# Patient Record
Sex: MALE | Race: WHITE | NOT HISPANIC OR LATINO | Employment: FULL TIME | URBAN - METROPOLITAN AREA
[De-identification: names, ages, dates, MRNs, and addresses within clinical notes are randomized per-mention and may not be internally consistent; named-entity substitution may affect disease eponyms.]

---

## 2019-03-19 ENCOUNTER — OFFICE VISIT - HEALTHEAST (OUTPATIENT)
Dept: FAMILY MEDICINE | Facility: CLINIC | Age: 29
End: 2019-03-19

## 2019-03-19 DIAGNOSIS — F33.0 MILD RECURRENT MAJOR DEPRESSION (H): ICD-10-CM

## 2019-03-19 DIAGNOSIS — F10.11 MILD ALCOHOL ABUSE IN SUSTAINED REMISSION: ICD-10-CM

## 2019-03-19 DIAGNOSIS — F41.9 ANXIETY: ICD-10-CM

## 2019-03-19 DIAGNOSIS — F19.20 CHEMICAL DEPENDENCY (H): ICD-10-CM

## 2019-03-19 LAB
ALBUMIN SERPL-MCNC: 4.2 G/DL (ref 3.5–5)
ALP SERPL-CCNC: 77 U/L (ref 45–120)
ALT SERPL W P-5'-P-CCNC: 19 U/L (ref 0–45)
ANION GAP SERPL CALCULATED.3IONS-SCNC: 8 MMOL/L (ref 5–18)
AST SERPL W P-5'-P-CCNC: 21 U/L (ref 0–40)
BILIRUB SERPL-MCNC: 0.6 MG/DL (ref 0–1)
BUN SERPL-MCNC: 9 MG/DL (ref 8–22)
CALCIUM SERPL-MCNC: 9.7 MG/DL (ref 8.5–10.5)
CHLORIDE BLD-SCNC: 103 MMOL/L (ref 98–107)
CO2 SERPL-SCNC: 31 MMOL/L (ref 22–31)
CREAT SERPL-MCNC: 0.82 MG/DL (ref 0.7–1.3)
GFR SERPL CREATININE-BSD FRML MDRD: >60 ML/MIN/1.73M2
GGT SERPL-CCNC: 14 U/L (ref 0–50)
GLUCOSE BLD-MCNC: 42 MG/DL (ref 70–125)
POTASSIUM BLD-SCNC: 4.3 MMOL/L (ref 3.5–5)
PROT SERPL-MCNC: 6.8 G/DL (ref 6–8)
SODIUM SERPL-SCNC: 142 MMOL/L (ref 136–145)

## 2019-03-19 ASSESSMENT — MIFFLIN-ST. JEOR: SCORE: 1759.01

## 2019-03-21 ENCOUNTER — COMMUNICATION - HEALTHEAST (OUTPATIENT)
Dept: FAMILY MEDICINE | Facility: CLINIC | Age: 29
End: 2019-03-21

## 2019-04-05 ENCOUNTER — OFFICE VISIT - HEALTHEAST (OUTPATIENT)
Dept: BEHAVIORAL HEALTH | Facility: CLINIC | Age: 29
End: 2019-04-05

## 2019-04-05 DIAGNOSIS — F10.11 MILD ALCOHOL ABUSE IN SUSTAINED REMISSION: ICD-10-CM

## 2019-07-22 ENCOUNTER — OFFICE VISIT - HEALTHEAST (OUTPATIENT)
Dept: BEHAVIORAL HEALTH | Facility: CLINIC | Age: 29
End: 2019-07-22

## 2019-07-22 DIAGNOSIS — F41.9 ANXIETY: ICD-10-CM

## 2019-07-22 DIAGNOSIS — F45.8 BRUXISM: ICD-10-CM

## 2019-07-22 DIAGNOSIS — F33.0 MILD RECURRENT MAJOR DEPRESSION (H): ICD-10-CM

## 2019-07-22 ASSESSMENT — MIFFLIN-ST. JEOR: SCORE: 1823.08

## 2020-01-10 ENCOUNTER — OFFICE VISIT - HEALTHEAST (OUTPATIENT)
Dept: SLEEP MEDICINE | Facility: CLINIC | Age: 30
End: 2020-01-10

## 2020-01-10 DIAGNOSIS — R06.83 SNORING: ICD-10-CM

## 2020-01-10 DIAGNOSIS — F41.9 ANXIETY: ICD-10-CM

## 2020-01-10 DIAGNOSIS — G47.10 HYPERSOMNIA, UNSPECIFIED: ICD-10-CM

## 2020-01-10 DIAGNOSIS — G47.63 BRUXISM, SLEEP-RELATED: ICD-10-CM

## 2020-01-10 ASSESSMENT — MIFFLIN-ST. JEOR: SCORE: 1806.75

## 2020-07-27 ENCOUNTER — OFFICE VISIT - HEALTHEAST (OUTPATIENT)
Dept: BEHAVIORAL HEALTH | Facility: CLINIC | Age: 30
End: 2020-07-27

## 2020-07-27 DIAGNOSIS — F33.0 MILD RECURRENT MAJOR DEPRESSION (H): ICD-10-CM

## 2020-07-27 DIAGNOSIS — F41.1 GENERALIZED ANXIETY DISORDER: ICD-10-CM

## 2020-07-27 ASSESSMENT — PATIENT HEALTH QUESTIONNAIRE - PHQ9: SUM OF ALL RESPONSES TO PHQ QUESTIONS 1-9: 13

## 2020-07-27 ASSESSMENT — ANXIETY QUESTIONNAIRES
GAD7 TOTAL SCORE: 7
5. BEING SO RESTLESS THAT IT IS HARD TO SIT STILL: SEVERAL DAYS
6. BECOMING EASILY ANNOYED OR IRRITABLE: SEVERAL DAYS
4. TROUBLE RELAXING: SEVERAL DAYS
7. FEELING AFRAID AS IF SOMETHING AWFUL MIGHT HAPPEN: SEVERAL DAYS
2. NOT BEING ABLE TO STOP OR CONTROL WORRYING: SEVERAL DAYS
1. FEELING NERVOUS, ANXIOUS, OR ON EDGE: SEVERAL DAYS
3. WORRYING TOO MUCH ABOUT DIFFERENT THINGS: SEVERAL DAYS

## 2020-09-11 ENCOUNTER — COMMUNICATION - HEALTHEAST (OUTPATIENT)
Dept: BEHAVIORAL HEALTH | Facility: CLINIC | Age: 30
End: 2020-09-11

## 2021-05-26 ASSESSMENT — PATIENT HEALTH QUESTIONNAIRE - PHQ9: SUM OF ALL RESPONSES TO PHQ QUESTIONS 1-9: 13

## 2021-05-28 ASSESSMENT — ANXIETY QUESTIONNAIRES: GAD7 TOTAL SCORE: 7

## 2021-05-30 NOTE — PROGRESS NOTES
Correct pharmacy verified with patient and confirmed in snapshot? [x] yes []no    Charge captured ? [x] yes  [] no    Medications Phoned  to Pharmacy [] yes [x]no  Name of Pharmacist:  List Medications, including dose, quantity and instructions      Medication Prescriptions given to patient   [] yes  [x] no   List the name of the drug the prescription was written for.       Medications ordered this visit were e-scribed.  Verified by order class [x] yes  [] no   Effexor and Gabapenttin  Medication changes or discontinuations were communicated to patient's pharmacy: [] yes  [x] no    UA collected [] yes  [x] no    [place] Prescription Monitoring [place] Reviewed? [x] yes  [] no    Referrals were made to: Sleep study.     Future appointment was made: [x] yes  [] no  9/23/19  Dictation completed at time of chart check: [] yes  [x] no    I have checked the documentation for today s encounters and the above information has been reviewed and completed.

## 2021-05-31 NOTE — PATIENT INSTRUCTIONS - HE
Please contact crisis or go to the emergency room if you have thoughts of self harm or suicide.  Take medication as prescribed. Please do not make changes or adjustments to your medications without talking to a health professional first.  Contact the pharmacy if you are out of medication and in need of a refill.

## 2021-06-02 VITALS — BODY MASS INDEX: 25.05 KG/M2 | HEIGHT: 70 IN | WEIGHT: 175 LBS

## 2021-06-03 VITALS — HEIGHT: 70 IN | WEIGHT: 190 LBS | BODY MASS INDEX: 27.2 KG/M2

## 2021-06-04 VITALS
HEIGHT: 70 IN | OXYGEN SATURATION: 98 % | HEART RATE: 65 BPM | SYSTOLIC BLOOD PRESSURE: 117 MMHG | WEIGHT: 186.4 LBS | BODY MASS INDEX: 26.69 KG/M2 | DIASTOLIC BLOOD PRESSURE: 74 MMHG

## 2021-06-05 NOTE — PROGRESS NOTES
[facility]  Outpatient Sleep Medicine Consultation      Name: Jese Martinez     MRN# [id number]  Age: 29 y.o.         YOB: 1990    Date of Consultation: 1/10/2020  Consultation is requested by: Rajan Sorto, CNP  [address] [place] [address]  Primary care provider: Ronnie Beltre MD        Assessment and Plan:  1. Snoring   Patient is being evaluated for Obstructive Sleep Apnea (ANTHONY).  Risk factors for ANTHONY include:  snoring, witnessed apneas, excessive daytime sleepiness/subjective tiredness, and male gender.  Recommend HSAT since patient is high risk for ANTHONY without any relevant comorbid conditions.  I counseled the patient that a negative home sleep apnea test would be reassuring but if his symptoms were to persist or worsen, evaluation with an attended polysomnogram would be indicated.  Patient will follow up on my chart for results.    2. Hypersomnia, unspecified  The patient was strongly advised to avoid driving, operating any heavy machinery or engaging in other hazardous situations while drowsy or sleepy.  Patient was counseled on the importance of driving while alert and to pull over if drowsy.    I advised that he optimize his sleep hygiene by maintaining fixed bedtimes and wake up times.  Additionally, he should manage his light exposure at night and in the morning to promote sleep or wakefulness as appropriate.    3. Bruxism, sleep-related  Sleep-related bruxism is apparent and if obstructive sleep apnea is in the mild range he may consider an oral appliance that would address both sleep disordered breathing and bruxism.    4. Anxiety  I suggested that he discuss treatment of anxiety with gabapentin with his prescribing provider.  Gabapentin may be contributing to daytime sleepiness since one of the most common side effects is somnolence.  Alternatively, the dose may be reduced or the medication may be consumed at  "nighttime.    Chief Complaint: \"I stop breathing and sweat during my sleep\"      History of Present Illness:    Jese Martinez is a 29 y.o. male who I am seeing for the first time in my adult sleep medicine clinic.  He reports that snoring and gasping for breath have been reported to him during his sleep.  Additionally, he notes that he has profuse sweating during sleep.  Sleep-related bruxism is also problematic and he is being evaluated for a bite splint.  He has daytime hypersomnolence which has been problematic.  The patient works for [employer] on the [employer] and has a job that involves the potential for danger if he is not vigilant.    Usual bedtime is variable between 9 PM and 12 AM.  He estimates a 20-minute sleep latency.  He estimates 1-2 awakenings per night and his final wake up time is between 7 and 8 AM.  He estimates 6 to 8 hours of sleep on a nightly basis and it is sleep \"okay\".  He typically consumes 2 coffees in the morning.  He is recently following a vegan diet.  He endorses excessive daytime sleepiness and tiredness during the day and his score on the [place] Sleepiness Scale is borderline abnormal with 10 points out of 24.    Medical history is significant for anxiety and outpatient treatment for clonazepam wean related to alcohol abuse.  He reports that he has not taken clonazepam or used alcohol for about 4 years.  He was formally a smoker but quit about 1.5 years ago.  He has no history of hypertension or diabetes.  His BMI is slightly elevated but his body habitus would suggest that this is related to muscle mass.    Family history is positive for snoring in his father.  There are no known family members with sleep apnea.  Maternal grandfather had a history of heart disease.  Patient takes Effexor and gabapentin, typically at 7 AM.  He explains that gabapentin was prescribed as he tapered off of clonazepam.  Apparently there was some speculation about a possibility of " "seizure while he was in outpatient care.  Review of systems is positive for a stomach rash which she reports is related to working out.  He also has intentionally lost some weight recently.            Medications:      Current Outpatient Medications   Medication Sig Dispense Refill     gabapentin (NEURONTIN) 600 MG tablet Take 1 tablet (600 mg total) by mouth 2 (two) times a day. 60 tablet 11     venlafaxine (EFFEXOR-XR) 150 MG 24 hr capsule Take 1 capsule (150 mg total) by mouth Daily at 8:00 am.. 30 capsule 11     No current facility-administered medications for this visit.          No Known Allergies      Past Medical History:    Past Medical History:   Diagnosis Date     Chemical dependency (H)      Depression          Past Surgical History:    Past Surgical History:   Procedure Laterality Date     CLAVICLE SURGERY Left      LAPAROSCOPIC INGUINAL HERNIA REPAIR Right      RADICAL ORCHIECTOMY Left          Social History:    Social History     Tobacco Use     Smoking status: Former Smoker     Types: Cigarettes     Last attempt to quit: 2018     Years since quittin.6     Smokeless tobacco: Current User     Tobacco comment: vaping   Substance Use Topics     Alcohol use: No     Frequency: Never         Family History:    Family History   Problem Relation Age of Onset     Cancer Mother      Alcohol abuse Sister      Depression Sister      Drug abuse Sister      Mental illness Sister      Heart disease Maternal Grandfather          Review of Systems:    A complete 10 point review of systems was negative other than HPI or as commented below.      Physical Examination:  /74   Pulse 65   Ht 5' 10\" (1.778 m)   Wt 186 lb 6.4 oz (84.6 kg)   SpO2 98%   BMI 26.75 kg/m    Neck circumference: 15 inches  Constitutional: . Awake, alert, cooperative, in no apparent distress.  Eyes: No icterus.  ENT: Mallampati Class: III.  Tongue:  large.  Nose: Nares patent. No exudate/erythema. No septal deviation noted.  "   Cardiovascular: Regular S1 and S2, no gallops or murmurs.  Neck: Supple, no thyroid enlargement.  Pulmonary:  Chest symmetric, lungs clear bilaterally and no crackles, wheezes or rales.  Extremities:  No pretibial edema.  Skin:  No rash or significant lesions visible.  Gait:  Normal.  Neurologic: Alert, oriented, no focal neurological deficit.  Psychological: Euthymic; affect appropriate.    Data: All pertinent previous laboratory data reviewed.    No results found for: PH  No results found for: TSH  No components found for: GLC  Lab Results   Component Value Date    HGB 14.6 07/06/2012     Lab Results   Component Value Date    BUN 9 03/19/2019     Lab Results   Component Value Date    AST 21 03/19/2019    ALT 19 03/19/2019    ALKPHOS 77 03/19/2019     No components found for: UAMP, UBARB, BENZODIAZEUR, UCANN, UCOC, OPIT, UPCP      Carin Segura MD  1/10/2020  [facility]  [address], [address]  [place]  [address]  [phone]    Copy to: Ronnie Beltre MD

## 2021-06-10 ENCOUNTER — OFFICE VISIT (OUTPATIENT)
Dept: FAMILY MEDICINE | Facility: CLINIC | Age: 31
End: 2021-06-10
Payer: COMMERCIAL

## 2021-06-10 VITALS
TEMPERATURE: 97.5 F | HEIGHT: 70 IN | OXYGEN SATURATION: 97 % | WEIGHT: 209 LBS | HEART RATE: 73 BPM | DIASTOLIC BLOOD PRESSURE: 80 MMHG | BODY MASS INDEX: 29.92 KG/M2 | SYSTOLIC BLOOD PRESSURE: 124 MMHG

## 2021-06-10 DIAGNOSIS — R07.89 ATYPICAL CHEST PAIN: Primary | ICD-10-CM

## 2021-06-10 PROCEDURE — 99214 OFFICE O/P EST MOD 30 MIN: CPT | Performed by: FAMILY MEDICINE

## 2021-06-10 RX ORDER — CYCLOBENZAPRINE HCL 5 MG
5-10 TABLET ORAL
Qty: 30 TABLET | Refills: 0 | Status: SHIPPED | OUTPATIENT
Start: 2021-06-10 | End: 2021-06-29

## 2021-06-10 RX ORDER — VENLAFAXINE HYDROCHLORIDE 150 MG/1
150 CAPSULE, EXTENDED RELEASE ORAL DAILY
COMMUNITY
Start: 2021-06-10 | End: 2021-08-05

## 2021-06-10 ASSESSMENT — PAIN SCALES - GENERAL: PAINLEVEL: MODERATE PAIN (5)

## 2021-06-10 ASSESSMENT — MIFFLIN-ST. JEOR: SCORE: 1914.03

## 2021-06-10 NOTE — PROGRESS NOTES
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  Continue self-help recovery, and general awareness about recovery principles.    He remains sober at this time.  I will move this diagnosis into medical history.     Major depressive disorder, recurrent in full remission: Patient doing well and currently stable on venlafaxine.  He is taking this for a long time.  No clear side effects, though he has had some bruxism-bruxism predated initiation of venlafaxine.  The bruxism is faded.  He continues to take venlafaxine, and feels it is helpful for him.  Depression remains in and in remission at this time.  Follow-up with me in 6 months.     ADHD: Appears borderline clinically significant.  A SRS-V1.1 symptom checklist: 2 out of the 6 part a questions suggest further screening to corroborate functionality deficits associated with ADHD.  He was diagnosed with ADHD in his late teen years, and placed on stimulant therapy in his late teen years with significant benefit on Vyvanse.  This is all per patient report.  Consider Vyvanse if patient is going to school, and struggling with focus and concentration.  Patient is doing well at work and home.  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  No change to the treatment plan, which is to monitor ADHD, and consider treatment if he starts school and there is functional impact.     Fatigue:  This appears / described as improved.  The sleep specialist reviewed and recommended consideration for reducing gabapentin.  Client tells me he has reduced gabapentin in the past, and this caused worsening mood and irritability.  He feels that fatigue is minimal at this point and tolerable.  He would like to continue on gabapentin.  I did review the risks benefits and potential adverse reactions with  gabapentin with the patient verbalizing an adequate understanding.     Bruxism: Does not appear pertinent to psychiatric / mental health issues, but it was identified during original assessment in July 2019.  Bruxism since late adolescence. Bruxism predtaes psychotropic medications.   He reports that there is less bruxism.  He is going to the dentist next month.  Sleep specialist is recommending HSAT aand patient says he will follow up with this.    HPI: Jese Martinze is a 29 y.o. male with generalized anxiety disorder and history of depression.  Both of these are in remission at this time.  He reports he continues to take venlafaxine and gabapentin.  He did undergo an assessment by sleep medicine specialist.  The sleep medicine specialist recommended a trial of a CPAP at home, and the patient says he will follow through with this, however with the coronavirus, it has put a delay in getting this done.  He says he intends to follow-up and is aware of the contact information believes he will do so.    He reports bruxism is less noticeable.  He says he has been attending to help oral hygiene, and has a dental appointment next month.    He reports his appetite is normal.  He reports fairly good sleep at this point.  He reports manageable anxiety.  He denies panic attacks.  He denies excessive worry, worrying about too many different things, or difficulty controlling worry.  He says that in general his mood is good.    He tells me that he has in the past tried reducing gabapentin, he says that he forgot to put it in his medication set in the past, and this led to him having a worse mood, progressively worsening with irritability, mood swings, and general emotional discomfort when he realized he had not been taking gabapentin as prescribed.  He did get back on it, would like to continue taking it at this time.  He notes no other side effects to gabapentin or venlafaxine.    He quit smoking a year and a half ago.  " Mild intensity urges to drink, but easily managed, and he continues to be sober since 2015.  He reports he works out on a regular basis since the gyms have opened back up.  Dental appointment in August.    Anxiety and worry: \"its been alright.\"  \"I do gt it from time to time.\" \"I try to not watch to much news.\"    Sleep: \"good.\"    Mood: good.    He works with [employer] and says things are going well there despite significant reduction in number of people flying.    No Known Allergies  Current Outpatient Medications   Medication Sig Dispense Refill     gabapentin (NEURONTIN) 600 MG tablet Take 1 tablet (600 mg total) by mouth 2 (two) times a day. 60 tablet 11     venlafaxine (EFFEXOR-XR) 150 MG 24 hr capsule Take 1 capsule (150 mg total) by mouth Daily at 8:00 am.. 30 capsule 11     No current facility-administered medications for this visit.          Medication adherence: Reviewed risk/benefits of medication , Patient able to verbalize understanding of side effects  and Patient verbally consents to taking medications  Medication side effects: none  The patient was given information on medications gabapentin and venlafaxine.  [place] Prescription Monitoring program: No worrisome pharmacy activity    Clinical Outcomes Measures:  PHQ-9: PHQ-9 Total Score: 13  DANIE-7:  7      Lab Results: Reviewed  No visits with results within 30 Day(s) from this visit.   Latest known visit with results is:   Office Visit on 03/19/2019   Component Date Value     Sodium 03/19/2019 142      Potassium 03/19/2019 4.3      Chloride 03/19/2019 103      CO2 03/19/2019 31      Anion Gap, Calculation 03/19/2019 8      Glucose 03/19/2019 42*     BUN 03/19/2019 9      Creatinine 03/19/2019 0.82      GFR MDRD Af Amer 03/19/2019 >60      GFR MDRD Non Af Amer 03/19/2019 >60      Bilirubin, Total 03/19/2019 0.6      Calcium 03/19/2019 9.7      Protein, Total 03/19/2019 6.8      Albumin 03/19/2019 4.2      Alkaline Phosphatase 03/19/2019 77      " "AST 03/19/2019 21      ALT 03/19/2019 19      GGT (Gamma GT) 03/19/2019 14        Review of Systems: [unfilled]    Psychiatric Examination:  There were no vitals filed for this visit.  There is no height or weight on file to calculate BMI.  Appearance: The patient appears stated age, is appropriately dressed in work attire.    Reliability:  Patient appears to be an adequate historian.    Behavior: Patient makes good eye contact, and engages with normal rapport in the interview.   There is no evidence of responding to hallucinations or flashbacks.  Speech: Speech is coherent, with a normal rate, rhythm and tone.    Language:There are no difficulties with expressive or receptive language as observed throughout the interview.    Mood: Described as \"pretty good\".    Affect: Congruent and shows a normal range and level of reactivity.    Judgement: Able to make basic decision regarding safety.  Insight: Good awareness of physical and mental health conditions and aware of needs around care for these.  Thought process: Logical   Thought content: No evidence of delusions or paranoia.  No thoughts of self harm or suicide. No thoughts of harming others.  Associations: Connected  Fund of knowledge: Average  Attention / Concentration: Able to remain focused during the interview with minimal distractibility or need for redirection.  Short Term Memory: Grossly intact as evidence by client recalling themes and ideas discussed.  Long Term Memory: Intact  No abnormal motor movements observed in face.  extremetis not observed..      [facility]  Psychiatry Nurse Practitioner    This note was created with help of Dragon dictation system. Grammatical and word substitution errors are not intentional.      "

## 2021-06-10 NOTE — PATIENT INSTRUCTIONS - HE
Please contact crisis or go to the emergency room if you have thoughts of self harm or suicide.  Take medication as prescribed. Please do not make changes or adjustments to your medications without talking to a health professional first.  Contact the pharmacy if you are out of medication and in need of a refill.  F/U 6 months

## 2021-06-10 NOTE — PROGRESS NOTES
Assessment & Plans  Jese is a 30 year old male who presents to follow up on chronic chest pain.     Differentials include but is not limited to costochondritis vs GERD vs peptic ulcer disease vs esophageal spasm/dysmotility vs functional chest pain vs drug induced (cocain/methamphetamin). Patient reports pain is worse after workout/heavy wt lifting.  Patient has been sober form drugs, alcohol and smoking for about 6 years - that is reassuring against the  possibility of cocaine/drugs induced chest pain. Patient is not immunocompromised, no hx of candidiasis, no dysphagia and pain is not post prandial - all these are reassuring against esophageal origin.     At this point likely etiology is Costochondritis  - Due to ongoing symptoms which were not typical ACS, EKG was not done. If symptoms continue to persist then will order stress test and CXR for further evaluation  - Scheduled Ibuprofen 400 to 600 mg three times daily as needed for pain, with food.   - Flexeril 5 to 10 mg at night as needed for pain, avoid driving after taking medication.   - Call or message on Monday if symptoms are not improving.       Subjective   Jese is a 30 year old who presents to follow up on chest pain.     HPI   Jese Martinez is a 30 y.o. male with hx of generalized anxiety disorder, chemical dependency (has been sober for 6 years), history of depression, ADHD (both of these are under controlled at this time). He presents today to discuss about chronic chest pain that has been ongoing for about a year and half.     He reports his c/pain has been an ongoing issue for about 1& 1/2 years, resides on the left 2nd/3rd sternal side, comes and goes in nature, does not radiate. sometimes 2/10, sometimes 4/10, especially after heavy workout. He reports he works out regularly, he is also a  at the gym. Sometimes lifts about 100 pounds. His pain aggravates day after heavy workout.    He reports sometimes he gets excessive  "saliva, at bed time, sour test, and needs throat clearing that makes chest pain better. Reports dry cough as well.     He reports he has been sober from alcohol/smoking/other chemicals/drugs for about 6 years. His moode is well, does not feel hopeless, helpless, does not have thoughts of hurting self or others and enjoied day to day activities. Otherwise he is doing well.    He continues to take venlafaxine and gabapentin and OTC pain medicine (NSAID) for chest pain.    Denies HA, SOB, neck pain, arm pain, light headedness, palpitation, fever and chills. Denies change in sleep, BM and urine. Appetite is good, mode is well.     A 10 point ROS negative otherwise mentioned in HPI      Objective    /80 (BP Location: Right arm, Patient Position: Chair, Cuff Size: Adult Regular)   Pulse 73   Temp 97.5  F (36.4  C) (Tympanic)   Ht 1.786 m (5' 10.3\")   Wt 94.8 kg (209 lb)   SpO2 97%   BMI 29.73 kg/m    Body mass index is 29.73 kg/m .     Vitals:  /80   Pulse 73   Temp 97.5  F (36.4  C)   Temp src Tympanic   SpO2 97 %       Physical Exam   GEN: Alert, Oriented, not in distress  RESP: lungs clear to auscultation - no rales, rhonchi or wheezes  CV: regular rate and rhythm, normal S1 S2, no murmur, click or rub  MS: + chest wall tenderness         Physician Attestation   I, Ct Hinojosa, was present with the medical/ARNULFO student who participated in the service and in the documentation of the note.  I have verified the history and personally performed the physical exam and medical decision making.  I agree with the assessment and plan of care as documented in the note.        Ct Hinojosa MD        "

## 2021-06-10 NOTE — PATIENT INSTRUCTIONS
Thursday to Monday -   Scheduled Ibuprofen 400 to 600 mg three times daily as needed for pain, please take with food.   Flexeril 5 to 10 mg at night as needed for pain, please do not drive after taking medication.   Call or message me on Monday if symptoms are not improving.

## 2021-06-19 NOTE — LETTER
Letter by Ronnie Beltre MD at      Author: Ronnie Beltre MD Service: -- Author Type: --    Filed:  Encounter Date: 3/21/2019 Status: (Other)         Jese Martinez  [address] [address]             March 21, 2019         Dear Mr. Martinez,    Below are the results from your recent visit:    Resulted Orders   Comprehensive Metabolic Panel   Result Value Ref Range    Sodium 142 136 - 145 mmol/L    Potassium 4.3 3.5 - 5.0 mmol/L    Chloride 103 98 - 107 mmol/L    CO2 31 22 - 31 mmol/L    Anion Gap, Calculation 8 5 - 18 mmol/L    Glucose 42 (LL) 70 - 125 mg/dL    BUN 9 8 - 22 mg/dL    Creatinine 0.82 0.70 - 1.30 mg/dL    GFR MDRD Af Amer >60 >60 mL/min/1.73m2    GFR MDRD Non Af Amer >60 >60 mL/min/1.73m2    Bilirubin, Total 0.6 0.0 - 1.0 mg/dL    Calcium 9.7 8.5 - 10.5 mg/dL    Protein, Total 6.8 6.0 - 8.0 g/dL    Albumin 4.2 3.5 - 5.0 g/dL    Alkaline Phosphatase 77 45 - 120 U/L    AST 21 0 - 40 U/L    ALT 19 0 - 45 U/L    Narrative    Fasting Glucose reference range is 70-99 mg/dL per  American Diabetes Association (ADA) guidelines.   GGT (Gamma GT)   Result Value Ref Range    GGT (Gamma GT) 14 0 - 50 U/L       Blood sugar on the low side, eat some  frequent snack.    Please call with questions or contact us using [employer].    Sincerely,        Electronically signed by Ronnie Beltre MD

## 2021-06-20 NOTE — LETTER
Letter by Svetlana Quevedo RN at      Author: Svetlana Quevedo RN Service: -- Author Type: --    Filed:  Encounter Date: 9/11/2020 Status: (Other)       September 11, 2020              Jese GARSIA Michelle  [address] [place] [address]      Dear Mr. Martinez:    Im writing to inform you that Rajan Sorto CNP will be leaving [facility] on September 28, 2020.  We apologize for this disruption in your care and we are committed to supporting your treatment needs. If you have follow-up appointments after September 28, 2020, we will connect you with another mental health provider within our system.  For medication refills, please contact your pharmacy and they will connect with us to initiate the refill process.     To schedule an appointment with Rajan Sorto CNP before September 28, 2020 please call [facility] at [phone]. If you do not plan to return for ongoing medication management at this time, please let us know that as well, so we can note that in your medical record.     Again, we apologize for the inconvenience and look forward to continuing to provide you with the best possible care.    Sincerely,        Electronically signed by Svetlana Quevedo RN   [facility]   [facility]   [address] [place] [address]   [phone]

## 2021-06-25 NOTE — PROGRESS NOTES
"OFFICE VISIT - FAMILY MEDICINE     ASSESSMENT AND PLAN     1. Mild alcohol abuse in sustained remission  Comprehensive Metabolic Panel    GGT (Gamma GT)   2. Anxiety  gabapentin (NEURONTIN) 600 MG tablet    Ambulatory referral to Psychiatry   3. Mild recurrent major depression (H)  venlafaxine (EFFEXOR-XR) 150 MG 24 hr capsule    Ambulatory referral to Psychiatry   4. Chemical dependency (H)     History of alcohol abuse, has been in remission for the past 4 years, encouraged to continue with sobriety.  Anxiety depression controlled with gabapentin and Effexor, new referral was placed to follow with psychiatrist.    CHIEF COMPLAINT   Establish Care (would like flu vaccine) and Referral (psychiatrist)    HPI   Jese Martinez is a 28 y.o. male.  No Patient Care Coordination Note on file.  New patient, history of alcohol abuse, previous treatment at [facility], sober for the past 4 years, history of anxiety with depression, currently taking gabapentin 300 mg twice daily, Effexor 150 mg once a day, looking for a new psychiatrist, asking for a referral.  Denies any worsening anxiety or depression symptoms.  He works for [employer] as a , non-smoker.  Quit smoking in June of last year.    Review of Systems As per HPI, otherwise negative.    OBJECTIVE   /65 (Patient Site: Right Arm, Patient Position: Sitting, Cuff Size: Adult Regular)   Pulse 67   Ht 5' 10.25\" (1.784 m)   Wt 175 lb (79.4 kg)   SpO2 98%   BMI 24.93 kg/m    Physical Exam   Constitutional: He is oriented to person, place, and time. He appears well-developed and well-nourished.   HENT:   Head: Normocephalic and atraumatic.   Neck: Normal range of motion. Neck supple. No JVD present. No tracheal deviation present. No thyromegaly present.   Cardiovascular: Normal rate, regular rhythm, normal heart sounds and intact distal pulses. Exam reveals no gallop and no friction rub.   No murmur heard.  Pulmonary/Chest: Effort normal and " breath sounds normal. No respiratory distress. He has no wheezes. He has no rales.   Musculoskeletal: He exhibits no edema or tenderness.   Lymphadenopathy:     He has no cervical adenopathy.   Neurological: He is alert and oriented to person, place, and time. Coordination normal.   Psychiatric: He has a normal mood and affect. Judgment and thought content normal.       PFSH     Family History   Problem Relation Age of Onset     Cancer Mother      Alcohol abuse Sister      Depression Sister      Drug abuse Sister      Mental illness Sister      Heart disease Maternal Grandfather      Social History     Socioeconomic History     Marital status: Single     Spouse name: Not on file     Number of children: Not on file     Years of education: Not on file     Highest education level: Not on file   Occupational History     Not on file   Social Needs     Financial resource strain: Not on file     Food insecurity:     Worry: Not on file     Inability: Not on file     Transportation needs:     Medical: Not on file     Non-medical: Not on file   Tobacco Use     Smoking status: Former Smoker     Types: Cigarettes     Last attempt to quit: 2018     Years since quittin.8     Smokeless tobacco: Current User     Tobacco comment: vaping   Substance and Sexual Activity     Alcohol use: No     Frequency: Never     Drug use: No     Comment: former user     Sexual activity: Not on file   Lifestyle     Physical activity:     Days per week: Not on file     Minutes per session: Not on file     Stress: Not on file   Relationships     Social connections:     Talks on phone: Not on file     Gets together: Not on file     Attends Faith service: Not on file     Active member of club or organization: Not on file     Attends meetings of clubs or organizations: Not on file     Relationship status: Not on file     Intimate partner violence:     Fear of current or ex partner: Not on file     Emotionally abused: Not on file     Physically  abused: Not on file     Forced sexual activity: Not on file   Other Topics Concern     Not on file   Social History Narrative     Not on file     Relevant history was reviewed with the patient today, unless noted in HPI, nothing is pertinent for this visit.  [facility]     Patient Active Problem List    Diagnosis Date Noted     Mild recurrent major depression (H) 03/19/2019     Anxiety 03/19/2019     Mild alcohol abuse in sustained remission 03/19/2019     Chemical dependency (H)      No past surgical history on file.    RESULTS/CONSULTS (Lab/Rad)     Recent Results (from the past 168 hour(s))   Comprehensive Metabolic Panel   Result Value Ref Range    Sodium 142 136 - 145 mmol/L    Potassium 4.3 3.5 - 5.0 mmol/L    Chloride 103 98 - 107 mmol/L    CO2 31 22 - 31 mmol/L    Anion Gap, Calculation 8 5 - 18 mmol/L    Glucose 42 (LL) 70 - 125 mg/dL    BUN 9 8 - 22 mg/dL    Creatinine 0.82 0.70 - 1.30 mg/dL    GFR MDRD Af Amer >60 >60 mL/min/1.73m2    GFR MDRD Non Af Amer >60 >60 mL/min/1.73m2    Bilirubin, Total 0.6 0.0 - 1.0 mg/dL    Calcium 9.7 8.5 - 10.5 mg/dL    Protein, Total 6.8 6.0 - 8.0 g/dL    Albumin 4.2 3.5 - 5.0 g/dL    Alkaline Phosphatase 77 45 - 120 U/L    AST 21 0 - 40 U/L    ALT 19 0 - 45 U/L   GGT (Gamma GT)   Result Value Ref Range    GGT (Gamma GT) 14 0 - 50 U/L     No results found.  MEDICATIONS     No current outpatient medications on file prior to visit.     No current facility-administered medications on file prior to visit.        HEALTH MAINTENANCE / SCREENING   PHQ-2 Total Score: 0 (3/19/2019 10:10 AM)  , PHQ-9 Total Score: 1 (3/19/2019 10:10 AM)  ,DANIE 7 Total Score: 0 (3/19/2019 12:00 PM)    Immunization History   Administered Date(s) Administered     Dtap 01/11/1990, 03/09/1990, 05/16/1990, 05/24/1991, 11/14/1991     HPV 9 Valent 07/25/2016     Hep A, historic 04/06/2007, 10/19/2007     Hep B, Adult 07/25/2011     Hep B, Peds or Adolescent 08/04/1999, 08/04/1999, 09/03/1999, 09/03/1999, 06/14/2000,  06/14/2000     Hepatitis A, Ped/Adol 2 Dose IM (18yr & under) 04/06/2007, 10/19/2007     HiB, historic,unspecified 02/06/1991, 05/24/1991, 09/06/1991     Hib (PRP-T) 02/06/1991, 05/24/1991, 09/06/1991     History of Varicella/chicken Pox 05/10/1996     IPV 01/11/1990, 03/09/1990, 05/24/1991, 11/14/1994     Influenza,seasonal quad, PF, 36+MOS 10/13/2015, 09/21/2017     Influenza,seasonal, Inj IIV3 11/18/2005, 09/06/2012     MMR 05/16/1990, 02/06/1991, 05/10/2002     Meningococcal MCV4P 06/01/2007     Meningococcal MPSV4, SQ 06/01/2007     Pneumo Polysac 23-V 05/29/2012     Td, Adult, Absorbed 05/10/2002     Tdap 09/12/2008, 09/08/2010     Health Maintenance   Topic     DEPRESSION FOLLOW UP      INFLUENZA VACCINE RULE BASED (1)     TD 18+ HE      ADVANCE DIRECTIVES DISCUSSED WITH PATIENT      TDAP ADULT ONE TIME DOSE        Ronnie Beltre MD  Family Medicine, [facility]     This note was dictated using a voice recognition software.  Any grammatical or context distortion are unintentional and inherent to the software.

## 2021-06-27 NOTE — PROGRESS NOTES
Progress Notes by Elda Tabor LPN at 7/22/2019  8:20 AM     Author: Elda Tabor LPN Service: -- Author Type: Licensed Nurse    Filed: 8/5/2019 11:12 AM Encounter Date: 7/22/2019 Status: Signed    : Elda Tabor LPN (Licensed Nurse)       Pt is here to establish care with psychiatrist.  Sleep: 8-12 hrs  Anxiety:minimal  Depression: denies  C/O of low energy sometimes

## 2021-06-27 NOTE — PROGRESS NOTES
Progress Notes by Laurita Dominique at 2019 12:00 PM     Author: Laurita Dominique Service: -- Author Type: Psychology Intern    Filed: 2019 10:42 AM Encounter Date: 2019 Status: Attested    : Laurita Dominique (Psychology Intern) Cosigner: Elena Cartagena Psy.D, LP at 2019 12:28 PM    Attestation signed by Elena Cartagena Psy.D, LP at 2019 12:28 PM    I have read, discussed and agree with the documentation provided by Laurita Dominique MA, Psychology Intern.  Elena Cartagena PsyD, ABPDAE, JOEL                  PSYCHOLOGY CONSULTATION    Date of Service:  2019  Duration:  1 hour (12pm - 1pm)    Name:  Jese Martinez  :  1990  MRN:  [id number]    This is a dual signature report.  My supervising clinician is Dr. Elena Cartagena.    The patient was referred  for a standard diagnostic assessment.    The purpose, benefits, and risks of psychological assessment and treatment were reviewed.  The patient agreed to proceed.  The patient was able to participate and benefit from treatment as evidenced by her verbal expression and understanding of ideas discussed.    Presenting Problem:    Jese Martinez is a 28 y.o., ,  single, identifies as a heterosexual, male who reports a history of substance use disorders.  He reported that he has completed programming for chemical dependency at [facility] and currently is seeking medication management from a psychiatrist in the community.  He reported that he plans to seek out ongoing psychotherapy as needed.  He reported that [facility] encouraged him to find these resources in the community. Jese said that he has struggled with his ability to concentrate since becoming sober, but is seeing progressive improvement on his current medications. He reported that he has been sober for approximately 4 years. He shared that his primary concerns have historically been substance use and anxiety. He reported that he started smoking  "weed and drinking at a young age and his parents became concerned and sent him to a \"sober school\" in [place]. He stated that he has been historically triggered by environmental stressors to use substances. He noted an example of a breakup with him and his girlfriend in high school. He was prescribed adderall for ADHD at the age of 19. He shared that he experimented with many drugs throughout his life and found clonopin to be one of the most problematic for himself.     When asked about what has contributed to his success in his sobriety he reported that he has been enjoying the \"freeing feeling\" he has now that he has \"nothing to hide.\" He also reported that eating healthy and having an active lifestyle has helped him to feel better physically.     Are there any other factors that are contributing to your presenting concerns (contextual non-personal factors contributing to presenting concerns):  Jese GARSIA Michelle denied contextual, non-personal factors contributing to his present concerns.    How do you perceive your condition compared to how others perceive your presenting concerns:  Jese reported being proud of himself and his sobriety and believes that others feel the same.     PYSCHIATRIC REVIEW OF CURRENT SYSTEMS:  Depressive Symptoms:  Patient reported indecisiveness, diminished ability to concentrate.    Manic Symptoms:  Patient reported that his only symptoms of freya existed when he was under the use of substances.     Anxiety Symptoms:  Patient reported that he was more anxious as a child, but now deals with it less. .     He reported that he feels as though he now has great coping strategies: he will check for evidence for his anxious thoughts and plan things out better now to avoid anxiety.    Panic Symptoms:  He denied a history of panic attacks. Although he did report that about a month of being sober was similar to panic attack     PTSD Symptoms:  Denied    Psychotic Symptoms:  Not since " becoming sober    Cognitive Symptoms: Jese reported some cognitive symptoms, including:confused by bills, sometimes getting lost, miss important steps of something, small oversights on tasks.     Other:    Jese Martinez denied difficulties with None.    Alcohol Abuse/Dependence:  Pt has a substance abuse hx, but has been sober for over 4 years.    HISTORY:  Past Psychiatric/Chemical Dependency History:  He indicated that he has a history of chemical dependency.    Medications:    Current Outpatient Medications:   ?  gabapentin (NEURONTIN) 600 MG tablet, Take 1 tablet (600 mg total) by mouth 2 (two) times a day., Disp: 60 tablet, Rfl: 3  ?  venlafaxine (EFFEXOR-XR) 150 MG 24 hr capsule, Take 1 capsule (150 mg total) by mouth Daily at 8:00 am.., Disp: 30 capsule, Rfl: 3    Past Medical History:   Patient confirmed Medical History as discussed in the record.    Past Medical History:   Diagnosis Date   ? Chemical dependency (H)    ? Depression        Allergies:  No Known Allergies    Past Family Psychiatric History:  Jese Martinez endorsed family psychiatric history. He reported that his aunt had a history of depression and suicide depression.     Past Family Medical/Physical History:  Jese Martinez denied significant family medical history.     Past Family Chemical Dependency History:  Jese Martinez endorsed family chemical dependency history. He reported that his grandpa was dependent on alcohol.     How does your culture impact health and healthcare: Jese Martinez denied cultural impact on his health and healthcare.   Health beliefs and engagement in cultural specific healing practices (describe the patients physical health sxs/diagnosis and use of traditional rather than western medical practices in treating illness): Standard western medical care    MENTAL STATUS EVALUATION  Grooming: Well groomed  Attire: Appropriate  Age: Appears Stated  Behavior Towards  "Examiner: cooperative, polite and friendly  Motor Activity: Within normal   Eye Contact: Appropriate  Mood: Euthymic  Affect: Congruent w/content of speech  Speech/Language: Within normal  Attention: intact  Concentration: intact  Thought Process: Within normal  Thought Content: Normal  Orientation: person, place, time and situation  Memory: No Evidence of Impairment  Judgement: No Evidence of Impairment  Estimated Intelligence: Average  Demonstrated Insight: Adequate  Fund of Knowledge: adequate    Mr. Martinez denied current suicidal ideation, intent, or plan.   He denied current homicidal ideation, intent, or plan.     SOCIAL HISTORY:  Jese Martinez was born in [place] and attended school in [place]. He reported that he has always had a close relationship with his mother, who first noticed his substance use problems. He shared that growing up he began experimenting with drugs recreationally with friends and had episodes of use with his partner at the time. He reported that he was often anxious in high school and used substances to make him \"feel how [he] wanted to feel.\"    At the age of 20, he reported intense use of \"bar drugs\". He shared that he had an episode of \"shaking in [his] room\" and in that moment realized \"what addiction was.\" He reportedly called his mom to ask for help. He described that time of his life as a point where he, \"sold everything, had bad friends, weighed 120 pounds, and had the  looking for [him].\"    Jese reported that since receiving treatment he has higher quality friends in his life who share similar values to himself. He said he prefers to spend time with his fellow sober friends. He shared that he goes to meetings every day that promote his sobriety and enjoys spending time at [employer], the gym, and working on his automotive hobbies. He reported being satisfied with finding activities that are healthy and not related to substance use. Jese shared that " he has inspired other co-workers to also adapt healthier lifestyles. He reported that he currently works for [employer] and has enjoyed this experience. His hard work has been acknowledged by his employer, and he has won awards for perfect attendance.     Important Developmental Incidence: He denied.     The patient denied physical, sexual, and emotional abuse.    School/Work History:  He denied learning disabilities.      Sexual Orientation: heterosexual  Current living situation:  He is currently living with girlfriend rent an apartment. No concerns for safety or ent .  Marital/relationship history: in relationship with his girlfriend  Patient Evaluation on quality of relationships:  5/5 for relationshipw   Social Network/Support Systems/Hobbies & Interests:  He described his support network as close friends, family, and co-workers. He reported he enjoys working on his jeep, working out, eating healthy, going to meetings, spending time with friends.  Strengths/Personal/Community Resources: He reported personal strengths including positive and get-along with everybody, go out of his way to share his talents to help people (e.g., with their cars), strong work ethic.  Spirituality beliefs:  Mr. Martinez endorsed Moravian affiliation. Rastafari, helpful coping strategy reading verses.    Cultural Identification:    Race:     Experience of cultural bias: He denied.    Immigration/history of status:  NA   Level of acculturation:  NA   Time orientation: He is present focused.   Age group identification:  young   Socioeconomic status:  middle class   Financial concerns: He denied.   Locus of Control: internal    Family Involvement: Is the family involved in the diagnostic assessment and process? No  If so, their agreement to referrals and follow-up plan: NA    LEGAL HISTORY:  Mr. Martinez endorsed a legal history which includes roommate finding paraphanilia and hit and run and a fight with police,  high speed miller,  within a month .    CLINICAL FORMULATION:  Jese Martinez is a 28 y.o., ,  not , identifies as a heterosexual, male who reports difficulties with concentration since becoming sober approximately 4 years ago. He reported that he would like to continue his current treatment plan for his recovery, as he feels he has seen improvements.    Mr. Martinez meets DSM-5 criteria for Mild alcohol abuse in sustained remission, as evidenced by his self-report of symptoms and review of the medical record.    WHODAS 12 item version was administered. Jese Martinez received a score of 12.5%.,    CLINICAL DIAGNOSIS:    Mild alcohol abuse in sustained remission, per medical record    Problem List:  Patient Active Problem List   Diagnosis   ? Mild recurrent major depression (H)   ? Anxiety   ? Mild alcohol abuse in sustained remission   ? Chemical dependency (H)       FOLLOW UP PLAN:  1. Jese Martinez would benefit from medication management in order to continue monitoring his concentration and attention concerns in sobriety.  The same prescriber for his psychiatric medication is recommended in order to streamline and maximize therapeutic gain.  2. Jese should continue to engage in his work with community support, through meetings, mentors, and healthy friendships to promote personal and social well-being.   3. Jese Martinez should engage in healthy sleep hygiene, in order to maximize his chances of being able to get a good night's rest once his psychological symptoms are better controlled. The National Sleep Foundation recommends healthy sleep hygiene activities including going to bed and waking at the same time each day, making sure the bedroom is a quiet, dark, relaxing environment, an appropriate temperature, and that it is used only for sleeping. Electronic devices should not be used while in bed.    Performed and documented by: Laurita Dominique MA,  Doctoral Psychology Student  Supervising Clinician: Dr. Elena Cartagena  Date:  4/5/2019  Time:  12:09 PM    I have read, discussed and agree with the documentation provided by Laurita Dominique MA, Psychology Intern.    Elena Cartagena PsyD, ABPP, LP

## 2021-06-28 DIAGNOSIS — R07.89 ATYPICAL CHEST PAIN: ICD-10-CM

## 2021-06-28 NOTE — TELEPHONE ENCOUNTER
Routing refill request to provider for review/approval because:  Drug not on the [facility] refill protocol         Last Written Prescription Date:  6/10/21  Last Fill Quantity: 30,  # refills: 0   Last office visit: 6/10/2021 with prescribing provider:     Future Office Visit:

## 2021-06-29 RX ORDER — CYCLOBENZAPRINE HCL 5 MG
5-10 TABLET ORAL
Qty: 30 TABLET | Refills: 0 | Status: SHIPPED | OUTPATIENT
Start: 2021-06-29 | End: 2021-08-05

## 2021-06-29 NOTE — PROGRESS NOTES
Progress Notes by Shiloh Macario CMA at 7/27/2020 10:40 AM     Author: Shiloh Macario CMA Service: -- Author Type: Certified Medical Assistant    Filed: 7/27/2020  1:08 PM Encounter Date: 7/27/2020 Status: Signed    : Shiloh Macario CMA (Certified Medical Assistant)       This video/telephone visit will be conducted via a call between you and your physician/provider. We have found that certain health care needs can be provided without the need for an in-person physical exam. This service lets us provide the care you need with a video /telephone conversation. If a prescription is necessary we can send it directly to your pharmacy. If lab work is needed we can place an order for that and you can then stop by our lab to have the test done at a later time.   Just as we bill insurance for in-person visits, we also bill insurance for video/telephone visits. If you have questions about your insurance coverage, we recommend that you speak with your insurance company.   Patient has given verbal consent for video/Telephone visit? yes  Patient would like the video visit invitation sent by:   My chart  Patient verified allergies, medications and pharmacy via phone. PHQ : 13 and DANIE: 7  done verbally with writer. Patient states he is ready for visit.  Shiloh Macario CMA  [place]  was reviewed prior to seeing patient today. See below for embedded report.

## 2021-08-05 ENCOUNTER — OFFICE VISIT (OUTPATIENT)
Dept: FAMILY MEDICINE | Facility: CLINIC | Age: 31
End: 2021-08-05
Payer: COMMERCIAL

## 2021-08-05 VITALS
DIASTOLIC BLOOD PRESSURE: 78 MMHG | SYSTOLIC BLOOD PRESSURE: 120 MMHG | WEIGHT: 202 LBS | OXYGEN SATURATION: 98 % | BODY MASS INDEX: 28.74 KG/M2 | HEART RATE: 78 BPM

## 2021-08-05 DIAGNOSIS — F41.9 ANXIETY: Primary | ICD-10-CM

## 2021-08-05 DIAGNOSIS — Z23 NEED FOR TDAP VACCINATION: ICD-10-CM

## 2021-08-05 DIAGNOSIS — F32.5 MAJOR DEPRESSION IN COMPLETE REMISSION (H): ICD-10-CM

## 2021-08-05 LAB
ALBUMIN SERPL-MCNC: 4 G/DL (ref 3.5–5)
ALP SERPL-CCNC: 75 U/L (ref 45–120)
ALT SERPL W P-5'-P-CCNC: 14 U/L (ref 0–45)
ANION GAP SERPL CALCULATED.3IONS-SCNC: 11 MMOL/L (ref 5–18)
AST SERPL W P-5'-P-CCNC: 15 U/L (ref 0–40)
BILIRUB SERPL-MCNC: 0.4 MG/DL (ref 0–1)
BUN SERPL-MCNC: 10 MG/DL (ref 8–22)
CALCIUM SERPL-MCNC: 10.1 MG/DL (ref 8.5–10.5)
CHLORIDE BLD-SCNC: 102 MMOL/L (ref 98–107)
CO2 SERPL-SCNC: 29 MMOL/L (ref 22–31)
CREAT SERPL-MCNC: 0.9 MG/DL (ref 0.7–1.3)
GFR SERPL CREATININE-BSD FRML MDRD: >90 ML/MIN/1.73M2
GLUCOSE BLD-MCNC: 96 MG/DL (ref 70–125)
POTASSIUM BLD-SCNC: 4.3 MMOL/L (ref 3.5–5)
PROT SERPL-MCNC: 7.9 G/DL (ref 6–8)
SODIUM SERPL-SCNC: 142 MMOL/L (ref 136–145)

## 2021-08-05 PROCEDURE — 96127 BRIEF EMOTIONAL/BEHAV ASSMT: CPT | Performed by: FAMILY MEDICINE

## 2021-08-05 PROCEDURE — 90715 TDAP VACCINE 7 YRS/> IM: CPT | Performed by: FAMILY MEDICINE

## 2021-08-05 PROCEDURE — 90471 IMMUNIZATION ADMIN: CPT | Performed by: FAMILY MEDICINE

## 2021-08-05 PROCEDURE — 99213 OFFICE O/P EST LOW 20 MIN: CPT | Mod: 25 | Performed by: FAMILY MEDICINE

## 2021-08-05 PROCEDURE — 80053 COMPREHEN METABOLIC PANEL: CPT | Performed by: FAMILY MEDICINE

## 2021-08-05 PROCEDURE — 36415 COLL VENOUS BLD VENIPUNCTURE: CPT | Performed by: FAMILY MEDICINE

## 2021-08-05 RX ORDER — GABAPENTIN 600 MG/1
600 TABLET ORAL 2 TIMES DAILY
Qty: 180 TABLET | Refills: 0 | Status: SHIPPED | OUTPATIENT
Start: 2021-08-05 | End: 2022-07-15

## 2021-08-05 RX ORDER — VENLAFAXINE HYDROCHLORIDE 150 MG/1
150 CAPSULE, EXTENDED RELEASE ORAL DAILY
Qty: 90 CAPSULE | Refills: 0 | Status: SHIPPED | OUTPATIENT
Start: 2021-08-05 | End: 2021-11-04

## 2021-08-05 ASSESSMENT — ANXIETY QUESTIONNAIRES
1. FEELING NERVOUS, ANXIOUS, OR ON EDGE: SEVERAL DAYS
IF YOU CHECKED OFF ANY PROBLEMS ON THIS QUESTIONNAIRE, HOW DIFFICULT HAVE THESE PROBLEMS MADE IT FOR YOU TO DO YOUR WORK, TAKE CARE OF THINGS AT HOME, OR GET ALONG WITH OTHER PEOPLE: SOMEWHAT DIFFICULT
3. WORRYING TOO MUCH ABOUT DIFFERENT THINGS: MORE THAN HALF THE DAYS
GAD7 TOTAL SCORE: 9
2. NOT BEING ABLE TO STOP OR CONTROL WORRYING: SEVERAL DAYS
7. FEELING AFRAID AS IF SOMETHING AWFUL MIGHT HAPPEN: MORE THAN HALF THE DAYS
5. BEING SO RESTLESS THAT IT IS HARD TO SIT STILL: SEVERAL DAYS
6. BECOMING EASILY ANNOYED OR IRRITABLE: SEVERAL DAYS

## 2021-08-05 ASSESSMENT — PATIENT HEALTH QUESTIONNAIRE - PHQ9
SUM OF ALL RESPONSES TO PHQ QUESTIONS 1-9: 12
5. POOR APPETITE OR OVEREATING: SEVERAL DAYS

## 2021-08-05 NOTE — PROGRESS NOTES
Assessment & Plan     Jese was seen today for medication check.    Diagnoses and all orders for this visit:    Anxiety  -     Comprehensive metabolic panel (BMP + Alb, Alk Phos, ALT, AST, Total. Bili, TP); Future  -     gabapentin (NEURONTIN) 600 MG tablet; Take 1 tablet (600 mg) by mouth 2 times daily  -     venlafaxine (EFFEXOR-XR) 150 MG 24 hr capsule; Take 1 capsule (150 mg) by mouth daily  -     Comprehensive metabolic panel (BMP + Alb, Alk Phos, ALT, AST, Total. Bili, TP)  Patient needs a refill of his medication until he can establish care with his new provider with psychiatry.   He has been stable on his medications for years.  Will check labs today.    Major depression in complete remission (H)    Need for Tdap vaccination  -     TDAP VACCINE (Adacel, Boostrix)  [[id number]]      No follow-ups on file.    Valeria Jernigan MD  [facility]   Jese is a 30 year old who presents for the following health issues     HPI   1) Depression and Anxiety.  Previously saw Dr. Sorto at [facility], who then left.  Patient was transferred care to another doctor who then called in sick for his appointment.  They are now booked out until October.  Patient notes that the end goal is continue care with psychiatry at [facility], but he he is due to run out of his medication.    Anxiety: diagnosed for 20 years. Patient has been well controlled on venlafaxine  mg and Gabapentin 600 mg BID.  Sleep is going really well.  Now 6 years  Sober.  No side effects with this medication.          Objective    /78 (BP Location: Left arm, Patient Position: Sitting, Cuff Size: Adult Regular)   Pulse 78   Wt 91.6 kg (202 lb)   SpO2 98%   BMI 28.74 kg/m    Body mass index is 28.74 kg/m .  Physical Exam     Psych Exam:  Behavior:normal    Mood:pleaseant, upbeat   Affect:normal   Eye Contact:normal   Thought Content: normal   Insight:normal    Judgement: normal        PHQ-9 score:    PHQ  8/5/2021   PHQ-9 Total Score 12   Q9: Thoughts of better off dead/self-harm past 2 weeks Not at all   Some encounter information is confidential and restricted. Go to Review Flowsheets activity to see all data.

## 2021-08-06 ASSESSMENT — ANXIETY QUESTIONNAIRES: GAD7 TOTAL SCORE: 9

## 2021-09-12 ENCOUNTER — HEALTH MAINTENANCE LETTER (OUTPATIENT)
Age: 31
End: 2021-09-12

## 2021-10-19 PROBLEM — F32.9 MAJOR DEPRESSION: Status: ACTIVE | Noted: 2021-08-05

## 2021-11-01 DIAGNOSIS — F41.9 ANXIETY: ICD-10-CM

## 2021-11-02 NOTE — TELEPHONE ENCOUNTER
"Routing refill request to provider for review/approval because:  No PCP    Last Written Prescription Date:  8/5/2021  Last Fill Quantity: 90,  # refills: 0   Last office visit provider:  8/5/2021     Requested Prescriptions   Pending Prescriptions Disp Refills     venlafaxine (EFFEXOR-XR) 150 MG 24 hr capsule [Pharmacy Med Name: VENLAFAXINE HCL  MG CAP] 90 capsule 0     Sig: TAKE 1 CAPSULE BY MOUTH EVERY DAY       Serotonin-Norepinephrine Reuptake Inhibitors  Passed - 11/1/2021 12:20 AM        Passed - Blood pressure under 140/90 in past 12 months     BP Readings from Last 3 Encounters:   08/05/21 120/78   06/10/21 124/80   01/10/20 117/74                 Passed - Recent (12 mo) or future (30 days) visit within the authorizing provider's specialty     Patient has had an office visit with the authorizing provider or a provider within the authorizing providers department within the previous 12 mos or has a future within next 30 days. See \"Patient Info\" tab in inbasket, or \"Choose Columns\" in Meds & Orders section of the refill encounter.              Passed - Medication is active on med list        Passed - Patient is age 18 or older        Passed - Normal serum creatinine on file in past 12 months     Recent Labs   Lab Test 08/05/21  1518   CR 0.90       Ok to refill medication if creatinine is low               Denise De La Fuente RN 11/01/21 10:28 PM  "

## 2021-11-04 NOTE — TELEPHONE ENCOUNTER
Pt is calling and he does have an appt but not until Nov 12 with Dr Lopez and wondering if he can get a refill until then.

## 2021-11-05 RX ORDER — VENLAFAXINE HYDROCHLORIDE 150 MG/1
CAPSULE, EXTENDED RELEASE ORAL
Qty: 30 CAPSULE | Refills: 0 | Status: SHIPPED | OUTPATIENT
Start: 2021-11-05

## 2022-07-14 ENCOUNTER — HOSPITAL ENCOUNTER (EMERGENCY)
Facility: CLINIC | Age: 32
Discharge: HOME | End: 2022-07-15
Attending: EMERGENCY MEDICINE | Admitting: EMERGENCY MEDICINE
Payer: COMMERCIAL

## 2022-07-14 DIAGNOSIS — F41.9 ANXIETY: ICD-10-CM

## 2022-07-14 DIAGNOSIS — F90.0 ATTENTION DEFICIT HYPERACTIVITY DISORDER (ADHD), PREDOMINANTLY INATTENTIVE TYPE: Chronic | ICD-10-CM

## 2022-07-14 DIAGNOSIS — F33.42 RECURRENT MAJOR DEPRESSIVE DISORDER, IN FULL REMISSION (H): ICD-10-CM

## 2022-07-14 DIAGNOSIS — F22 PARANOIA (H): ICD-10-CM

## 2022-07-14 LAB — SARS-COV-2 RNA RESP QL NAA+PROBE: NEGATIVE

## 2022-07-14 PROCEDURE — C9803 HOPD COVID-19 SPEC COLLECT: HCPCS

## 2022-07-14 PROCEDURE — 99285 EMERGENCY DEPT VISIT HI MDM: CPT | Mod: CS,25

## 2022-07-14 PROCEDURE — 80307 DRUG TEST PRSMV CHEM ANLYZR: CPT | Performed by: EMERGENCY MEDICINE

## 2022-07-14 PROCEDURE — U0005 INFEC AGEN DETEC AMPLI PROBE: HCPCS | Performed by: EMERGENCY MEDICINE

## 2022-07-14 ASSESSMENT — ENCOUNTER SYMPTOMS
COUGH: 0
VOMITING: 0

## 2022-07-15 ENCOUNTER — TELEPHONE (OUTPATIENT)
Dept: EMERGENCY MEDICINE | Facility: CLINIC | Age: 32
End: 2022-07-15

## 2022-07-15 VITALS
RESPIRATION RATE: 16 BRPM | HEIGHT: 70 IN | OXYGEN SATURATION: 94 % | WEIGHT: 179.1 LBS | SYSTOLIC BLOOD PRESSURE: 126 MMHG | HEART RATE: 69 BPM | TEMPERATURE: 98.1 F | DIASTOLIC BLOOD PRESSURE: 79 MMHG | BODY MASS INDEX: 25.64 KG/M2

## 2022-07-15 LAB
AMPHETAMINES UR QL SCN: ABNORMAL
BARBITURATES UR QL: ABNORMAL
BENZODIAZ UR QL: ABNORMAL
CANNABINOIDS UR QL SCN: ABNORMAL
COCAINE UR QL: ABNORMAL
OPIATES UR QL SCN: ABNORMAL
PCP UR QL SCN: ABNORMAL

## 2022-07-15 PROCEDURE — 99205 OFFICE O/P NEW HI 60 MIN: CPT | Performed by: PSYCHIATRY & NEUROLOGY

## 2022-07-15 PROCEDURE — 90791 PSYCH DIAGNOSTIC EVALUATION: CPT

## 2022-07-15 PROCEDURE — 250N000013 HC RX MED GY IP 250 OP 250 PS 637: Performed by: PSYCHIATRY & NEUROLOGY

## 2022-07-15 RX ORDER — OLANZAPINE 5 MG/1
5 TABLET ORAL AT BEDTIME
Qty: 30 TABLET | Refills: 0 | Status: SHIPPED | OUTPATIENT
Start: 2022-07-15

## 2022-07-15 RX ORDER — VENLAFAXINE HYDROCHLORIDE 150 MG/1
150 CAPSULE, EXTENDED RELEASE ORAL
Status: DISCONTINUED | OUTPATIENT
Start: 2022-07-15 | End: 2022-07-15 | Stop reason: HOSPADM

## 2022-07-15 RX ORDER — GABAPENTIN 300 MG/1
300 CAPSULE ORAL 3 TIMES DAILY
Status: DISCONTINUED | OUTPATIENT
Start: 2022-07-15 | End: 2022-07-15 | Stop reason: HOSPADM

## 2022-07-15 RX ORDER — HYDROXYZINE HYDROCHLORIDE 50 MG/1
50 TABLET, FILM COATED ORAL EVERY 6 HOURS PRN
Status: DISCONTINUED | OUTPATIENT
Start: 2022-07-15 | End: 2022-07-15 | Stop reason: HOSPADM

## 2022-07-15 RX ORDER — OLANZAPINE 10 MG/2ML
10 INJECTION, POWDER, FOR SOLUTION INTRAMUSCULAR 2 TIMES DAILY PRN
Status: DISCONTINUED | OUTPATIENT
Start: 2022-07-15 | End: 2022-07-15 | Stop reason: HOSPADM

## 2022-07-15 RX ORDER — OLANZAPINE 10 MG/1
10 TABLET, ORALLY DISINTEGRATING ORAL 2 TIMES DAILY PRN
Status: DISCONTINUED | OUTPATIENT
Start: 2022-07-15 | End: 2022-07-15 | Stop reason: HOSPADM

## 2022-07-15 RX ORDER — LANOLIN ALCOHOL/MO/W.PET/CERES
3 CREAM (GRAM) TOPICAL
Status: DISCONTINUED | OUTPATIENT
Start: 2022-07-15 | End: 2022-07-15 | Stop reason: HOSPADM

## 2022-07-15 RX ORDER — ACETAMINOPHEN 325 MG/1
650 TABLET ORAL EVERY 4 HOURS PRN
Status: DISCONTINUED | OUTPATIENT
Start: 2022-07-15 | End: 2022-07-15 | Stop reason: HOSPADM

## 2022-07-15 RX ORDER — ONDANSETRON 4 MG/1
4 TABLET, ORALLY DISINTEGRATING ORAL EVERY 6 HOURS PRN
Status: DISCONTINUED | OUTPATIENT
Start: 2022-07-15 | End: 2022-07-15 | Stop reason: HOSPADM

## 2022-07-15 RX ADMIN — GABAPENTIN 300 MG: 300 CAPSULE ORAL at 08:50

## 2022-07-15 RX ADMIN — VENLAFAXINE HYDROCHLORIDE 150 MG: 150 CAPSULE, EXTENDED RELEASE ORAL at 08:50

## 2022-07-15 RX ADMIN — NICOTINE POLACRILEX 2 MG: 2 GUM, CHEWING ORAL at 08:52

## 2022-07-15 RX ADMIN — NICOTINE POLACRILEX 2 MG: 2 GUM, CHEWING ORAL at 07:03

## 2022-07-15 NOTE — PROGRESS NOTES
VS assessed, WNL. Given scheduled morning medications. Ate breakfast. Agreeable to meet with [facility].
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
## 2022-07-15 NOTE — PROGRESS NOTES
RN introduced self to patient at start of shift. Oriented patient to care setting. Encouraged patient to order breakfast. Patient pleasant and cooperative. Ordered breakfast independently.
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
## 2022-07-15 NOTE — CONSULTS
TELEPSYCHIATRY TELEPHONE NOTE    Discussed with nurse Hali.  31-year-old male presented with paranoia.  Patient has been medically cleared.  PMH negative for significant medical illness.  NKDA.  No known serious substance withdrawal reactions.  Lab findings: COVID negative. Urine drug screen: +cannabis. Alcohol level: unavailable.     PLAN:    --Disposition: Hold patient in ED for reassessment in AM.  Routine PRN orders placed, restarted home meds.          Mykel Hartmann MD  Psychiatrist
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
## 2022-07-15 NOTE — PROGRESS NOTES
Patient agreeable to discharge plan. Discharge instructions reviewed with patient including follow-up care plan. Medications: Zyprexa 5 mg prescribed and filled at  discharge pharmacy, medications reviewed and sent home with patient. Reviewed safety plan and outpatient resources. Denies SI and HI. All belongings that were brought into the hospital have been returned to patient. Escorted off the unit at 1245 accompanied by [facility] staff. Discharged to home via ride from significant other.

## 2022-07-15 NOTE — DISCHARGE INSTRUCTIONS
Safety and Aftercare Plan:     If I am feeling unsafe or I am in a crisis, I will:   Contact my established care providers   Call the National Suicide Prevention Lifeline: 791.998.9940   Go to the nearest emergency room   Call 911          Warning signs that I or other people might notice when a crisis is developing for me: I am having increasing suicidal thoughts that turn to plans with intent or means, I am having additional urges to self-harm, my emotions are of hopelessness, feeling like there's no way out, rage or anger, engaging in risky activities without thinking, withdrawing from family/friends, dramatic mood swings, drastic personality changes, use of alcohol or drugs, neglect of personal hygiene or cares       Things I am able to do on my own to cope or help me feel better: Exercise, listen to music, practice deep breathing, meditations, write in a journal, self-regulate, self check-in, ask for help when needed       Things that I am able to do with others to cope or help me better: Spending quality time with loved ones, Staying hydrated, Eating balanced meals, Going for a walk every day, Take care of daily responsibilities/needs, Focus on positive self-talk vs negative self-talk      Things I can use or do for distraction: Reach out to/spend time with family and friends, take a warm shower or bath, Exercise, chores or do a project, listen to music, watch movie/TV, journaling, reading a book, meditating, call a friend       Changes I can make to support my mental health and wellness:     -I will abstain from all mood altering chemicals not currently prescribed to me       -I will attend scheduled mental health therapy and psychiatric appointments and follow all recommendations      -I will commit to 30 minutes of self care daily - this can be as simple as taking a shower, going for a walk, cooking a meal, read, writing, etc      -I will practice square breathing when I begin to feel anxious - in breath  through the nose for the count of 4 and the first line on the square. Out breath through the mouth for the count of 4 for the second line of the square. Repeat to complete the square. Repeat the square as many times as needed.      - I will use distraction skills of: going for walks, watching TV, spending time outside, calling a friend or family member      -Use community resources, including hotline numbers, [facility] crisis and support meetings      -Maintain a daily schedule/routine      -Practice deep breathing skills      -Download a meditation jenny and spend 15-20 minutes per day mediating/relaxing. Some apps to download include: Calm, Headspace and Insight Timer. All 3 of these apps have free version      People in my life that I can ask for help: my girlfriend, family, friends, my therapist or psychiatry provider     Your [facility] has a mental health crisis team you can call 24/7: [facility] Crisis Line Number: [phone]      Other things that are important when I m in crisis:     Reduce Extreme Emotion  QUICKLY:  Changing Your Body Chemistry       T:  Change your body Temperature to change your autonomic nervous system         Use Ice Water to calm yourself down FAST         Put your face in a bowl of ice water (this is the best way; have the person keep his/her face in ice water for 30-45 seconds - initial research is showing that the longer s/he can hold her/his face in the water, the better the response), or         Splash ice water on your face, or hold an ice pack on your face        I:  Intensely exercise to calm down a body revved up by emotion          Examples: running, walking fast, jumping, playing basketball, weight lifting, swimming, calisthenics, etc.           Engage in exercises that DO NOT include violent behaviors. Exercises that utilize violent behaviors tend to function as  behavioral rehearsal,  and rather than calming the person down, may actually  rev  the person up more, increasing  the likelihood of violence, and lessening the likelihood that they will  burn off  energy       P:  Progressively relax your muscles          Starting with your hands, moving to your forearms, upper arms, shoulders, neck, forehead, eyes, cheeks and lips, tongue and teeth, chest, upper back, stomach, buttocks, thighs, calves, ankles, feet          Tense (10 seconds,   of the way), then relax each muscle (all the way)          Notice the tension          Notice the difference when relaxed (by tensing first, and then relaxing, you are able to get a more thorough relaxation than by simply relaxing)        P: Paced breathing to relax          The standard technique is to begin with counting the number of steps one takes for a typical inhale, then counting the steps one takes for a typical exhale, and then lengthening the amount of steps for the exhalation by one or two steps.  OR         Repeat this pattern for 1-2 minutes          Inhale for four (4) seconds          Exhale for six (6) to eight (8) seconds          Research demonstrated that one can change one's overall level of anxiety by doing this exercise for even a few minutes per day         Additional resources and appointment information: Patient declined scheduling assistance at this time, he reports he has a therapy appointment scheduled next week, he states he can also call his psychiatry provider to request a sooner appointment.       Crisis Lines  Crisis Text Line  Text 087925  You will be connected with a trained live crisis counselor to provide support.    Gambling Hotline  1.800.265.hope [4673]    National Hope Line  1.800.SUICIDE [2874514]    National Suicide Prevention Lifeline  Free and confidential support  1.800.588.TALK [4997]  http://suicidepreventionlifeline.org    The Song Project (LGBTQ Youth Crisis Line)  5.940.625.8601  text START to 981-476    's Crisis Line  5.179.812.0894 (Press 1)  or text 575069    Community  "Resources  Fast Tracker  Linking people to mental health and substance use disorder resources  fasttrackermn.org     [place] Mental Health Warm Line  Peer to peer support  Monday thru Saturday, 12 pm to 10 pm  698.519.4635 or 5.212.175.4316  Text \"Support\" to 14165    National [place] [employer] Mental [employer]ness (SHANDA)  188.685.4572 or 1.888.SHANDA.HELPS    Walk-in Counseling Center  Free mental health counseling  [address] [facility]  665.357.1083    Mental Health Apps  My3  https://SP3H.FTBpro/    VirtualHopeBox  https://The New Daily/apps/virtual-hope-box/    Suicide Safety Plan (Indelsul)    Calm Harm      Additional information:  Today you were seen by a licensed mental health professional through Triage and Transition services, [facility] ([facility])  for a crisis assessment in the Emergency Department at [facility].  It is recommended that you follow up with your established providers (psychiatrist, mental health therapist, and/or primary care doctor - as relevant) as soon as possible. Coordinators from [facility] will be calling you in the next 24-48 hours to ensure that you have the resources you need.  You can also contact [facility] coordinators directly at [phone]. You may have been scheduled for or offered an appointment with a mental health provider. [facility] maintains an extensive network of licensed behavioral health providers to connect patients with the services they need.  We do not charge providers a fee to participate in our referral network.  We match patients with providers based on a patient's specific needs, insurance coverage, and location.  Our first effort will be to refer you to a provider within your care system, and will utilize providers outside your care system as needed.      "
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
## 2022-08-11 ENCOUNTER — VIRTUAL VISIT (OUTPATIENT)
Dept: FAMILY MEDICINE | Facility: CLINIC | Age: 32
End: 2022-08-11
Payer: COMMERCIAL

## 2022-08-11 DIAGNOSIS — R06.83 SNORING: Primary | ICD-10-CM

## 2022-08-11 PROCEDURE — 99213 OFFICE O/P EST LOW 20 MIN: CPT | Mod: 95 | Performed by: FAMILY MEDICINE

## 2022-08-11 RX ORDER — GABAPENTIN 600 MG/1
600 TABLET ORAL 2 TIMES DAILY
COMMUNITY
Start: 2022-07-22

## 2022-08-11 ASSESSMENT — PATIENT HEALTH QUESTIONNAIRE - PHQ9
SUM OF ALL RESPONSES TO PHQ QUESTIONS 1-9: 18
5. POOR APPETITE OR OVEREATING: SEVERAL DAYS

## 2022-08-11 ASSESSMENT — ANXIETY QUESTIONNAIRES
2. NOT BEING ABLE TO STOP OR CONTROL WORRYING: MORE THAN HALF THE DAYS
GAD7 TOTAL SCORE: 9
3. WORRYING TOO MUCH ABOUT DIFFERENT THINGS: NEARLY EVERY DAY
IF YOU CHECKED OFF ANY PROBLEMS ON THIS QUESTIONNAIRE, HOW DIFFICULT HAVE THESE PROBLEMS MADE IT FOR YOU TO DO YOUR WORK, TAKE CARE OF THINGS AT HOME, OR GET ALONG WITH OTHER PEOPLE: VERY DIFFICULT
7. FEELING AFRAID AS IF SOMETHING AWFUL MIGHT HAPPEN: SEVERAL DAYS
GAD7 TOTAL SCORE: 9
1. FEELING NERVOUS, ANXIOUS, OR ON EDGE: SEVERAL DAYS
6. BECOMING EASILY ANNOYED OR IRRITABLE: NOT AT ALL
5. BEING SO RESTLESS THAT IT IS HARD TO SIT STILL: SEVERAL DAYS

## 2022-08-11 NOTE — PROGRESS NOTES
Jese is a 31 year old who is being evaluated via a billable telephone visit.      What phone number would you like to be contacted at? [phone]  How would you like to obtain your AVS? Oleg Sawant was seen today for hospital f/u.    Diagnoses and all orders for this visit:    Snoring  -     Adult Sleep Eval & Management  Referral; Future  Initially, it sounded like the patient needed FMLA.  He notes that his old psychiatrist did submit paperwork excusing him from his work for the next month until he can establish care with [facility].  Patient would like is a referral back to sleep clinic.  He was hoping I could help him with his sleep challenges by ordering a CPAP, but I did note that he would need a polysomnogram through sleep clinic.  He had started a work-up prior to the pandemic.        Anders Sawant is a 31 year old, presenting for the following health issues:  Hospital F/U ([facility]; 7/14/22-7/15/22; Anxiety; Depression; Paranoia)      HPI   Stress:  Patient was just at [facility] ER 7/14-7/15 for mental health concerns.  [place]  Father diagnosed with cancer.    Patient started on CBD. It spiked his anxiety.  Urine came back positive for THC.  Has an appointment with [facility] with psychiatrist and therapist in person on the 18th.    Seeing a current psychiatrist, but out of network.  Old psychiatrist did submit paperwork excusing him for the month.    Took some time off right now.        Sleep has been a challenge.  He has difficulties with sleep and fatigue.  He notes that he was evaluated by sleep clinic 2 to 3 years ago and believes he was approved for CPAP.  He is wondering if he can get one now.  I did find his evaluation that occurred in January 2020, right before the lockdown.  The recommendation was for him to pursue a polysomnogram.  Discussed that based on this, I cannot prescribe a CPAP, but since he is establish care  hopefully he can move to the diagnostic phase of his workup.      Objective        PHQ 7/27/2020 8/5/2021 8/11/2022   PHQ-9 Total Score 13 12 18   Q9: Thoughts of better off dead/self-harm past 2 weeks Several days Not at all Not at all     DANIE-7 SCORE 7/27/2020 8/5/2021 8/11/2022   Total Score 7 9 9             Vitals:  No vitals were obtained today due to virtual visit.    Physical Exam   healthy, alert and no distress  Psych Exam:  Behavior:normal    Mood:depressed   Affect:normal   Eye Contact:unable to assess   Thought Content: normal   Insight:normal    Judgement: normal                Phone call duration: 26 minutes    .  ..

## 2022-10-03 ENCOUNTER — OFFICE VISIT (OUTPATIENT)
Dept: INTERNAL MEDICINE | Facility: CLINIC | Age: 32
End: 2022-10-03
Payer: COMMERCIAL

## 2022-10-03 VITALS
WEIGHT: 189 LBS | TEMPERATURE: 97.8 F | DIASTOLIC BLOOD PRESSURE: 79 MMHG | SYSTOLIC BLOOD PRESSURE: 144 MMHG | RESPIRATION RATE: 12 BRPM | OXYGEN SATURATION: 99 % | HEART RATE: 88 BPM | BODY MASS INDEX: 27.12 KG/M2

## 2022-10-03 DIAGNOSIS — F33.42 RECURRENT MAJOR DEPRESSIVE DISORDER, IN FULL REMISSION (H): Primary | ICD-10-CM

## 2022-10-03 DIAGNOSIS — E03.9 ACQUIRED HYPOTHYROIDISM: ICD-10-CM

## 2022-10-03 DIAGNOSIS — Z11.4 SCREENING FOR HIV (HUMAN IMMUNODEFICIENCY VIRUS): ICD-10-CM

## 2022-10-03 DIAGNOSIS — Z11.59 NEED FOR HEPATITIS C SCREENING TEST: ICD-10-CM

## 2022-10-03 DIAGNOSIS — F41.9 ANXIETY: ICD-10-CM

## 2022-10-03 DIAGNOSIS — E29.1 HYPOGONADISM MALE: ICD-10-CM

## 2022-10-03 PROBLEM — F17.200 TOBACCO DEPENDENCE SYNDROME: Status: ACTIVE | Noted: 2022-10-03

## 2022-10-03 PROBLEM — D49.59 TESTICULAR NEOPLASM: Status: ACTIVE | Noted: 2022-10-03

## 2022-10-03 LAB
ALBUMIN SERPL BCG-MCNC: 5 G/DL (ref 3.5–5.2)
ALP SERPL-CCNC: 73 U/L (ref 40–129)
ALT SERPL W P-5'-P-CCNC: 27 U/L (ref 10–50)
ANION GAP SERPL CALCULATED.3IONS-SCNC: 9 MMOL/L (ref 7–15)
AST SERPL W P-5'-P-CCNC: 24 U/L (ref 10–50)
BILIRUB SERPL-MCNC: 0.4 MG/DL
BUN SERPL-MCNC: 11.9 MG/DL (ref 6–20)
CALCIUM SERPL-MCNC: 9.7 MG/DL (ref 8.6–10)
CHLORIDE SERPL-SCNC: 97 MMOL/L (ref 98–107)
CREAT SERPL-MCNC: 0.8 MG/DL (ref 0.67–1.17)
DEPRECATED HCO3 PLAS-SCNC: 32 MMOL/L (ref 22–29)
ERYTHROCYTE [DISTWIDTH] IN BLOOD BY AUTOMATED COUNT: 12.1 % (ref 10–15)
GFR SERPL CREATININE-BSD FRML MDRD: >90 ML/MIN/1.73M2
GLUCOSE SERPL-MCNC: 90 MG/DL (ref 70–99)
HCT VFR BLD AUTO: 47.6 % (ref 40–53)
HGB BLD-MCNC: 16.2 G/DL (ref 13.3–17.7)
MCH RBC QN AUTO: 30.7 PG (ref 26.5–33)
MCHC RBC AUTO-ENTMCNC: 34 G/DL (ref 31.5–36.5)
MCV RBC AUTO: 90 FL (ref 78–100)
PLATELET # BLD AUTO: 292 10E3/UL (ref 150–450)
POTASSIUM SERPL-SCNC: 4.2 MMOL/L (ref 3.4–5.3)
PROT SERPL-MCNC: 7.8 G/DL (ref 6.4–8.3)
RBC # BLD AUTO: 5.27 10E6/UL (ref 4.4–5.9)
SODIUM SERPL-SCNC: 138 MMOL/L (ref 136–145)
TSH SERPL DL<=0.005 MIU/L-ACNC: 1.32 UIU/ML (ref 0.3–4.2)
WBC # BLD AUTO: 11.4 10E3/UL (ref 4–11)

## 2022-10-03 PROCEDURE — 85027 COMPLETE CBC AUTOMATED: CPT | Performed by: INTERNAL MEDICINE

## 2022-10-03 PROCEDURE — 36415 COLL VENOUS BLD VENIPUNCTURE: CPT | Performed by: INTERNAL MEDICINE

## 2022-10-03 PROCEDURE — 84403 ASSAY OF TOTAL TESTOSTERONE: CPT | Performed by: INTERNAL MEDICINE

## 2022-10-03 PROCEDURE — 84443 ASSAY THYROID STIM HORMONE: CPT | Performed by: INTERNAL MEDICINE

## 2022-10-03 PROCEDURE — 99214 OFFICE O/P EST MOD 30 MIN: CPT | Performed by: INTERNAL MEDICINE

## 2022-10-03 PROCEDURE — 80053 COMPREHEN METABOLIC PANEL: CPT | Performed by: INTERNAL MEDICINE

## 2022-10-03 PROCEDURE — 86803 HEPATITIS C AB TEST: CPT | Performed by: INTERNAL MEDICINE

## 2022-10-03 ASSESSMENT — PATIENT HEALTH QUESTIONNAIRE - PHQ9
SUM OF ALL RESPONSES TO PHQ QUESTIONS 1-9: 6
SUM OF ALL RESPONSES TO PHQ QUESTIONS 1-9: 6
10. IF YOU CHECKED OFF ANY PROBLEMS, HOW DIFFICULT HAVE THESE PROBLEMS MADE IT FOR YOU TO DO YOUR WORK, TAKE CARE OF THINGS AT HOME, OR GET ALONG WITH OTHER PEOPLE: SOMEWHAT DIFFICULT

## 2022-10-03 ASSESSMENT — ANXIETY QUESTIONNAIRES
IF YOU CHECKED OFF ANY PROBLEMS ON THIS QUESTIONNAIRE, HOW DIFFICULT HAVE THESE PROBLEMS MADE IT FOR YOU TO DO YOUR WORK, TAKE CARE OF THINGS AT HOME, OR GET ALONG WITH OTHER PEOPLE: SOMEWHAT DIFFICULT
6. BECOMING EASILY ANNOYED OR IRRITABLE: NOT AT ALL
GAD7 TOTAL SCORE: 5
4. TROUBLE RELAXING: MORE THAN HALF THE DAYS
GAD7 TOTAL SCORE: 5
2. NOT BEING ABLE TO STOP OR CONTROL WORRYING: NOT AT ALL
7. FEELING AFRAID AS IF SOMETHING AWFUL MIGHT HAPPEN: NOT AT ALL
7. FEELING AFRAID AS IF SOMETHING AWFUL MIGHT HAPPEN: NOT AT ALL
GAD7 TOTAL SCORE: 5
3. WORRYING TOO MUCH ABOUT DIFFERENT THINGS: SEVERAL DAYS
8. IF YOU CHECKED OFF ANY PROBLEMS, HOW DIFFICULT HAVE THESE MADE IT FOR YOU TO DO YOUR WORK, TAKE CARE OF THINGS AT HOME, OR GET ALONG WITH OTHER PEOPLE?: SOMEWHAT DIFFICULT
5. BEING SO RESTLESS THAT IT IS HARD TO SIT STILL: SEVERAL DAYS
1. FEELING NERVOUS, ANXIOUS, OR ON EDGE: SEVERAL DAYS

## 2022-10-03 NOTE — PROGRESS NOTES
"  Assessment & Plan     Screening for HIV (human immunodeficiency virus)  - HIV Antigen Antibody Combo    Need for hepatitis C screening test  - Hepatitis C Screen Reflex to HCV RNA Quant and Genotype    Recurrent major depressive disorder  Improved with current treatments.    - Comprehensive metabolic panel  - CBC with platelets    Anxiety  Ongoing treatment.     Possible Hypogonadism male  Has orchiectomy for undescended testicle, minimal symptoms.    - Testosterone, total    R/O Acquired hypothyroidism  - TSH with free T4 reflex    Nicotine/Tobacco Cessation:  He reports that he has been smoking cigarettes. He has a 0.10 pack-year smoking history. He has never used smokeless tobacco.  Now using mostly vaping.     BMI:   Estimated body mass index is 27.12 kg/m  as calculated from the following:    Height as of 7/15/22: 1.778 m (5' 10\").    Weight as of this encounter: 85.7 kg (189 lb).     Return in about 6 months (around 4/3/2023) for Follow up.    Jairon Bergman MD  [facility]      Jese is a 31 year old accompanied by his self, presenting for the following health issues:  Office Visit and Recheck Medication (Needs Return to Work Letter. Testosterone concern, Blood Work iron deficiency in question.)  Has not been at work since 8/18/2022 for psychosocial issues and stress and has taken a leave.  Ready to return 10/10/2022 without restrictions.  Some fatigue and wonders about his testosterone level.  Sleeping OK, does some exercise and tolerates that well.  No unusual dyspnea or cough     Review of Systems   See HPI      Objective      PHYSICAL EXAM:  General Appearance: In no acute distress  BP (!) 144/79 (BP Location: Left arm, Patient Position: Sitting, Cuff Size: Adult Regular)   Pulse 88   Temp 97.8  F (36.6  C) (Oral)   Resp 12   Wt 85.7 kg (189 lb)   SpO2 99%   BMI 27.12 kg/m    RESPIRATORY: Clear   CARDIOVASCULAR: S1, S2  PSYCHIATRIC: Oriented X 3, " without confusion, behavior and affect normal, thinking is clear

## 2022-10-03 NOTE — LETTER
[facility]  [address] [place] [address]  [phone]          October 3, 2022    RE:  Jese Martinez                                                                                                                                                       [address] [facility] [address]            To whom it may concern:    Jese Martinez is under my professional care.  He has been ill and unable to work since 8/18/2022.  He can return to work, without restrictions, on 10/10/2022.    Sincerely,        Jairon Bergman MD

## 2022-10-04 LAB — HCV AB SERPL QL IA: NONREACTIVE

## 2022-10-05 LAB — TESTOST SERPL-MCNC: 362 NG/DL (ref 240–950)

## 2022-11-19 ENCOUNTER — HEALTH MAINTENANCE LETTER (OUTPATIENT)
Age: 32
End: 2022-11-19

## 2024-01-28 ENCOUNTER — HEALTH MAINTENANCE LETTER (OUTPATIENT)
Age: 34
End: 2024-01-28

## 2025-02-01 ENCOUNTER — HEALTH MAINTENANCE LETTER (OUTPATIENT)
Age: 35
End: 2025-02-01